# Patient Record
Sex: FEMALE | Race: WHITE | NOT HISPANIC OR LATINO | Employment: OTHER | URBAN - METROPOLITAN AREA
[De-identification: names, ages, dates, MRNs, and addresses within clinical notes are randomized per-mention and may not be internally consistent; named-entity substitution may affect disease eponyms.]

---

## 2023-03-06 LAB
ALANINE AMINOTRANSFERASE (SGPT) (U/L) IN SER/PLAS: 16 U/L (ref 7–45)
ALBUMIN (G/DL) IN SER/PLAS: 4.8 G/DL (ref 3.4–5)
ALKALINE PHOSPHATASE (U/L) IN SER/PLAS: 53 U/L (ref 33–136)
ANION GAP IN SER/PLAS: 13 MMOL/L (ref 10–20)
ASPARTATE AMINOTRANSFERASE (SGOT) (U/L) IN SER/PLAS: 23 U/L (ref 9–39)
BILIRUBIN TOTAL (MG/DL) IN SER/PLAS: 0.5 MG/DL (ref 0–1.2)
CALCIDIOL (25 OH VITAMIN D3) (NG/ML) IN SER/PLAS: 90 NG/ML
CALCIUM (MG/DL) IN SER/PLAS: 10.2 MG/DL (ref 8.6–10.6)
CARBON DIOXIDE, TOTAL (MMOL/L) IN SER/PLAS: 30 MMOL/L (ref 21–32)
CHLORIDE (MMOL/L) IN SER/PLAS: 104 MMOL/L (ref 98–107)
CREATININE (MG/DL) IN SER/PLAS: 0.73 MG/DL (ref 0.5–1.05)
ERYTHROCYTE DISTRIBUTION WIDTH (RATIO) BY AUTOMATED COUNT: 13.6 % (ref 11.5–14.5)
ERYTHROCYTE MEAN CORPUSCULAR HEMOGLOBIN CONCENTRATION (G/DL) BY AUTOMATED: 30.6 G/DL (ref 32–36)
ERYTHROCYTE MEAN CORPUSCULAR VOLUME (FL) BY AUTOMATED COUNT: 95 FL (ref 80–100)
ERYTHROCYTES (10*6/UL) IN BLOOD BY AUTOMATED COUNT: 4.05 X10E12/L (ref 4–5.2)
GFR FEMALE: 78 ML/MIN/1.73M2
GLUCOSE (MG/DL) IN SER/PLAS: 95 MG/DL (ref 74–99)
HEMATOCRIT (%) IN BLOOD BY AUTOMATED COUNT: 38.5 % (ref 36–46)
HEMOGLOBIN (G/DL) IN BLOOD: 11.8 G/DL (ref 12–16)
LEUKOCYTES (10*3/UL) IN BLOOD BY AUTOMATED COUNT: 6.7 X10E9/L (ref 4.4–11.3)
MAGNESIUM (MG/DL) IN SER/PLAS: 2.22 MG/DL (ref 1.6–2.4)
NRBC (PER 100 WBCS) BY AUTOMATED COUNT: 0 /100 WBC (ref 0–0)
PLATELETS (10*3/UL) IN BLOOD AUTOMATED COUNT: 298 X10E9/L (ref 150–450)
POTASSIUM (MMOL/L) IN SER/PLAS: 5 MMOL/L (ref 3.5–5.3)
PROTEIN TOTAL: 7 G/DL (ref 6.4–8.2)
SODIUM (MMOL/L) IN SER/PLAS: 142 MMOL/L (ref 136–145)
THYROTROPIN (MIU/L) IN SER/PLAS BY DETECTION LIMIT <= 0.05 MIU/L: 1.78 MIU/L (ref 0.44–3.98)
UREA NITROGEN (MG/DL) IN SER/PLAS: 23 MG/DL (ref 6–23)

## 2023-09-05 ENCOUNTER — OFFICE VISIT (OUTPATIENT)
Dept: PRIMARY CARE | Facility: CLINIC | Age: 88
End: 2023-09-05
Payer: MEDICARE

## 2023-09-05 VITALS
SYSTOLIC BLOOD PRESSURE: 182 MMHG | HEART RATE: 77 BPM | DIASTOLIC BLOOD PRESSURE: 81 MMHG | OXYGEN SATURATION: 97 % | WEIGHT: 94 LBS | HEIGHT: 59 IN | BODY MASS INDEX: 18.95 KG/M2

## 2023-09-05 DIAGNOSIS — R60.1 GENERALIZED EDEMA: ICD-10-CM

## 2023-09-05 DIAGNOSIS — R26.81 UNSTEADY GAIT: ICD-10-CM
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
## 2023-09-05 DIAGNOSIS — M79.89 LEG SWELLING: ICD-10-CM

## 2023-09-05 DIAGNOSIS — L97.529 ULCER OF BOTH FEET, UNSPECIFIED ULCER STAGE (MULTI): ICD-10-CM

## 2023-09-05 DIAGNOSIS — L97.519 ULCER OF BOTH FEET, UNSPECIFIED ULCER STAGE (MULTI): ICD-10-CM

## 2023-09-05 PROBLEM — M81.8 ADULT IDIOPATHIC GENERALIZED OSTEOPOROSIS: Status: ACTIVE | Noted: 2023-09-05

## 2023-09-05 PROBLEM — K86.89 SECONDARY PANCREATIC INSUFFICIENCY (HHS-HCC): Status: ACTIVE | Noted: 2023-09-05

## 2023-09-05 PROBLEM — I25.10 CAD (CORONARY ARTERY DISEASE): Status: ACTIVE | Noted: 2023-09-05

## 2023-09-05 PROBLEM — J44.9 COPD (CHRONIC OBSTRUCTIVE PULMONARY DISEASE) (MULTI): Status: ACTIVE | Noted: 2023-09-05

## 2023-09-05 PROBLEM — J45.991 COUGH VARIANT ASTHMA (HHS-HCC): Status: ACTIVE | Noted: 2023-09-05

## 2023-09-05 PROBLEM — E78.2 MIXED HYPERLIPIDEMIA: Status: ACTIVE | Noted: 2023-09-05

## 2023-09-05 PROBLEM — I48.91 ATRIAL FIBRILLATION (MULTI): Status: ACTIVE | Noted: 2023-09-05

## 2023-09-05 PROCEDURE — 99214 OFFICE O/P EST MOD 30 MIN: CPT | Performed by: INTERNAL MEDICINE

## 2023-09-05 RX ORDER — PANTOPRAZOLE SODIUM 40 MG/1
40 TABLET, DELAYED RELEASE ORAL 2 TIMES DAILY
COMMUNITY
End: 2024-02-07 | Stop reason: SDUPTHER

## 2023-09-05 RX ORDER — ALENDRONATE SODIUM 70 MG/1
70 TABLET ORAL
COMMUNITY
Start: 2023-03-29

## 2023-09-05 RX ORDER — ACETAMINOPHEN 500 MG
1 TABLET ORAL DAILY
COMMUNITY
Start: 2015-10-15 | End: 2024-02-07 | Stop reason: SDUPTHER

## 2023-09-05 RX ORDER — APIXABAN 2.5 MG/1
2.5 TABLET, FILM COATED ORAL 2 TIMES DAILY
COMMUNITY

## 2023-09-05 RX ORDER — MECLIZINE HCL 12.5 MG 12.5 MG/1
12.5 TABLET ORAL EVERY 8 HOURS PRN
COMMUNITY

## 2023-09-05 RX ORDER — METOPROLOL TARTRATE 25 MG/1
25 TABLET, FILM COATED ORAL 2 TIMES DAILY
COMMUNITY

## 2023-09-05 RX ORDER — SIMVASTATIN 40 MG/1
40 TABLET, FILM COATED ORAL NIGHTLY
COMMUNITY
Start: 2010-07-23 | End: 2024-02-07 | Stop reason: SDUPTHER

## 2023-09-05 NOTE — PROGRESS NOTES
"Subjective   Patient ID: Suly Bradley is a 89 y.o. female who presents for Hospital Follow-up.    HPI     Had a fall two weeks ago in beauty salon.  Tripped, fell forward, landed on knees, didn't hit head. Went to ED, records reviewed, imaging unremarkable. Then was back in the ED on 8/30 as she noticed her legs swelling.  Had neg DVT scan.  Feeling more unsteady on the feet.    Has had sores on both heels for 2-3 months.  Saw Dr Santizo, Podiatrist two weeks ago and nothing was done for her.  No fevers    Review of Systems    Objective   BP (!) 182/81   Pulse 77   Ht 1.499 m (4' 11\")   Wt (!) 42.6 kg (94 lb)   SpO2 97%   BMI 18.99 kg/m²     Physical Exam  Musculoskeletal:      Right lower leg: Edema present.      Left lower leg: Edema present.   Skin:     Comments: Bilateral heels with small 1cm sores without drainage, surrounding erythema or edema.         Assessment/Plan   Problem List Items Addressed This Visit          Musculoskeletal and Injuries    Fall - Primary    Relevant Orders    Referral to Home Care       Symptoms and Signs    Unsteady gait     Other Visit Diagnoses       Ulcer of both feet, unspecified ulcer stage (CMS/HCC)        Relevant Orders    Referral to Wound Clinic    Leg swelling        Relevant Orders    Transthoracic Echo (TTE) Complete    Generalized edema        Relevant Orders    Transthoracic Echo (TTE) Complete               "

## 2023-09-18 ENCOUNTER — TELEPHONE (OUTPATIENT)
Dept: PRIMARY CARE | Facility: CLINIC | Age: 88
End: 2023-09-18
Payer: COMMERCIAL

## 2023-09-18 DIAGNOSIS — R26.81 UNSTEADY GAIT: Primary | ICD-10-CM

## 2023-09-18 NOTE — TELEPHONE ENCOUNTER
Patient's son called. He said Suly has refused the home health order, because she still wants to try and go out to see the doctor.     He was hoping for a referral for Physical Therapy instead

## 2024-02-07 DIAGNOSIS — K21.9 GASTROESOPHAGEAL REFLUX DISEASE WITHOUT ESOPHAGITIS: ICD-10-CM

## 2024-02-07 DIAGNOSIS — E78.2 MIXED HYPERLIPIDEMIA: Primary | ICD-10-CM

## 2024-02-07 DIAGNOSIS — E55.9 VITAMIN D DEFICIENCY: ICD-10-CM

## 2024-02-07 RX ORDER — SIMVASTATIN 40 MG/1
40 TABLET, FILM COATED ORAL NIGHTLY
Qty: 90 TABLET | Refills: 1 | Status: SHIPPED | OUTPATIENT
Start: 2024-02-07

## 2024-02-07 RX ORDER — ACETAMINOPHEN 500 MG
5000 TABLET ORAL DAILY
Qty: 90 TABLET | Refills: 0 | Status: SHIPPED | OUTPATIENT
Start: 2024-02-07

## 2024-02-07 RX ORDER — PANTOPRAZOLE SODIUM 40 MG/1
40 TABLET, DELAYED RELEASE ORAL 2 TIMES DAILY
Qty: 90 TABLET | Refills: 1 | Status: SHIPPED | OUTPATIENT
Start: 2024-02-07

## 2024-02-07 NOTE — TELEPHONE ENCOUNTER
Sabas from [facility] called to have (3) medications refilled and to let physician know he plans on sending out nurse weekly.  He will have Plan of Care faxed to office.

## 2024-04-15 ENCOUNTER — LAB (OUTPATIENT)
Dept: LAB | Facility: LAB | Age: 89
End: 2024-04-15
Payer: MEDICARE

## 2024-04-15 ENCOUNTER — OFFICE VISIT (OUTPATIENT)
Dept: PRIMARY CARE | Facility: CLINIC | Age: 89
End: 2024-04-15
Payer: MEDICARE

## 2024-04-15 ENCOUNTER — APPOINTMENT (OUTPATIENT)
Dept: PRIMARY CARE | Facility: CLINIC | Age: 89
End: 2024-04-15
Payer: MEDICARE

## 2024-04-15 VITALS
WEIGHT: 84 LBS | BODY MASS INDEX: 16.49 KG/M2 | OXYGEN SATURATION: 97 % | HEIGHT: 60 IN | HEART RATE: 75 BPM | DIASTOLIC BLOOD PRESSURE: 69 MMHG | SYSTOLIC BLOOD PRESSURE: 137 MMHG

## 2024-04-15 DIAGNOSIS — I48.91 ATRIAL FIBRILLATION, UNSPECIFIED TYPE (MULTI): ICD-10-CM

## 2024-04-15 DIAGNOSIS — E03.9 HYPOTHYROIDISM, UNSPECIFIED TYPE: ICD-10-CM

## 2024-04-15 DIAGNOSIS — N39.0 URINARY TRACT INFECTION WITHOUT HEMATURIA, SITE UNSPECIFIED: ICD-10-CM

## 2024-04-15 DIAGNOSIS — D64.9 ANEMIA, UNSPECIFIED TYPE: ICD-10-CM

## 2024-04-15 DIAGNOSIS — Z00.00 ROUTINE GENERAL MEDICAL EXAMINATION AT A HEALTH CARE FACILITY: ICD-10-CM

## 2024-04-15 DIAGNOSIS — R44.3 HALLUCINATIONS: Primary | ICD-10-CM

## 2024-04-15 DIAGNOSIS — R73.02 IGT (IMPAIRED GLUCOSE TOLERANCE): ICD-10-CM

## 2024-04-15 LAB
BASOPHILS # BLD AUTO: 0.07 X10*3/UL (ref 0–0.1)
BASOPHILS NFR BLD AUTO: 1.4 %
EOSINOPHIL # BLD AUTO: 0.06 X10*3/UL (ref 0–0.4)
EOSINOPHIL NFR BLD AUTO: 1.2 %
ERYTHROCYTE [DISTWIDTH] IN BLOOD BY AUTOMATED COUNT: 13.8 % (ref 11.5–14.5)
EST. AVERAGE GLUCOSE BLD GHB EST-MCNC: 117 MG/DL
HBA1C MFR BLD: 5.7 %
HCT VFR BLD AUTO: 40.9 % (ref 36–46)
HGB BLD-MCNC: 12.2 G/DL (ref 12–16)
IMM GRANULOCYTES # BLD AUTO: 0.01 X10*3/UL (ref 0–0.5)
IMM GRANULOCYTES NFR BLD AUTO: 0.2 % (ref 0–0.9)
LYMPHOCYTES # BLD AUTO: 1.39 X10*3/UL (ref 0.8–3)
LYMPHOCYTES NFR BLD AUTO: 26.9 %
MCH RBC QN AUTO: 28.6 PG (ref 26–34)
MCHC RBC AUTO-ENTMCNC: 29.8 G/DL (ref 32–36)
MCV RBC AUTO: 96 FL (ref 80–100)
MONOCYTES # BLD AUTO: 0.56 X10*3/UL (ref 0.05–0.8)
MONOCYTES NFR BLD AUTO: 10.9 %
NEUTROPHILS # BLD AUTO: 3.07 X10*3/UL (ref 1.6–5.5)
NEUTROPHILS NFR BLD AUTO: 59.4 %
NRBC BLD-RTO: 0 /100 WBCS (ref 0–0)
PLATELET # BLD AUTO: 301 X10*3/UL (ref 150–450)
RBC # BLD AUTO: 4.26 X10*6/UL (ref 4–5.2)
WBC # BLD AUTO: 5.2 X10*3/UL (ref 4.4–11.3)

## 2024-04-15 PROCEDURE — 1159F MED LIST DOCD IN RCRD: CPT | Performed by: EMERGENCY MEDICINE

## 2024-04-15 PROCEDURE — 80053 COMPREHEN METABOLIC PANEL: CPT

## 2024-04-15 PROCEDURE — 83036 HEMOGLOBIN GLYCOSYLATED A1C: CPT

## 2024-04-15 PROCEDURE — 36415 COLL VENOUS BLD VENIPUNCTURE: CPT

## 2024-04-15 PROCEDURE — 85025 COMPLETE CBC W/AUTO DIFF WBC: CPT

## 2024-04-15 PROCEDURE — 81001 URINALYSIS AUTO W/SCOPE: CPT

## 2024-04-15 PROCEDURE — 99214 OFFICE O/P EST MOD 30 MIN: CPT | Performed by: EMERGENCY MEDICINE

## 2024-04-15 PROCEDURE — 1036F TOBACCO NON-USER: CPT | Performed by: EMERGENCY MEDICINE

## 2024-04-15 PROCEDURE — 84443 ASSAY THYROID STIM HORMONE: CPT

## 2024-04-15 RX ORDER — DICYCLOMINE HYDROCHLORIDE 20 MG/1
20 TABLET ORAL 3 TIMES DAILY PRN
Qty: 30 TABLET | Refills: 0 | Status: SHIPPED | OUTPATIENT
Start: 2024-04-15 | End: 2024-06-14

## 2024-04-15 RX ORDER — LISINOPRIL 2.5 MG/1
TABLET ORAL
COMMUNITY
Start: 2023-12-13

## 2024-04-15 NOTE — PROGRESS NOTES
Subjective   Patient ID: Suly Bradley is a 89 y.o. female who presents for Establish Care.    Assessment/Plan   Problem List Items Addressed This Visit       Atrial fibrillation (Multi)     Other Visit Diagnoses       Hallucinations    -  Primary    Anemia, unspecified type        Relevant Orders    CBC and Auto Differential    Routine general medical examination at a health care facility        Relevant Orders    Comprehensive Metabolic Panel    IGT (impaired glucose tolerance)        Relevant Orders    Hemoglobin A1C    Hypothyroidism, unspecified type        Relevant Orders    TSH with reflex to Free T4 if abnormal    Urinary tract infection without hematuria, site unspecified        Relevant Orders    Urinalysis with Reflex Microscopic          Hallucinations- Check CBC, CMP, TSH and UA/culture   CT brain ordered   Referred to [facility]     Atrial fibrillation- on Metoprolol and eliquis, follows regularly with Dr. Cohen.     Hypertension- stable, continue Lisinopril     GERD- Pantoprazole prn     Follow up as needed     Source of history: Nurse, Medical personnel, Medical record, Patient.  History limitation: None.    HPI  89 y.o. female here to establish care     Patient is very hard of hearing.     Presents with son.   Multiple incidents of auditory and visual hallucinations over the past month, police were called twice    First incident patient heard a man trying to break into her home. Didn't see anyone at first but could hear voices.     Next episode saw a girl walk into the home. States that she asked her to leave but she would not. She saw the girl walk away and into a mirror.     She has been very afraid to fall asleep since and continues to have difficulty sleeping.     Lives in home independently, son lives 5 minutes away. Patient does not want to go to care facility.       Allergies   Allergen Reactions    Sulfa (Sulfonamide Antibiotics) Shortness of breath    Codeine Other    Meperidine Other     Morphine Other    Penicillins Rash       Current Outpatient Medications   Medication Sig Dispense Refill    cholecalciferol (Vitamin D-3) 5,000 Units tablet Take 1 tablet (5,000 Units) by mouth once daily. 90 tablet 0    Eliquis 2.5 mg tablet Take 1 tablet (2.5 mg) by mouth 2 times a day.      lisinopril 2.5 mg tablet Take by mouth.      metoprolol tartrate (Lopressor) 25 mg tablet Take 1 tablet (25 mg) by mouth 2 times a day.      pantoprazole (ProtoNix) 40 mg EC tablet Take 1 tablet (40 mg) by mouth 2 times a day. 90 tablet 1    simvastatin (Zocor) 40 mg tablet Take 1 tablet (40 mg) by mouth once daily at bedtime. 90 tablet 1    alendronate (Fosamax) 70 mg tablet Take 1 tablet (70 mg) by mouth 1 (one) time per week.      meclizine (Antivert) 12.5 mg tablet Take 1 tablet (12.5 mg) by mouth every 8 hours if needed.       No current facility-administered medications for this visit.       Objective   Visit Vitals  /69   Pulse 75   Ht 1.524 m (5')   Wt (!) 38.1 kg (84 lb)   SpO2 97%   BMI 16.41 kg/m²   Smoking Status Never   BSA 1.27 m²     Physical Exam  Vital signs as per nursing/MA documentation   General appearance: Alert and in no acute distress  HEENT: Normal Inspection   Neck: Normal Inspection   Respiratory: No respiratory distress Lungs are clear   Cardiovascular: Heart rate normal. No gallop  Back: Normal Inspection   Skin inspection: Warm   Musculoskeletal: No deformities   Neuro: Limited exam. Baseline    Review of Systems   Comprehensive review of systems as allowed by patient condition and nursing input is negative    No visits with results within 4 Month(s) from this visit.   Latest known visit with results is:   Orders Only on 03/06/2023   Component Date Value Ref Range Status    Glucose 03/06/2023 95  74 - 99 mg/dL Final    Sodium 03/06/2023 142  136 - 145 mmol/L Final    Potassium 03/06/2023 5.0  3.5 - 5.3 mmol/L Final    Chloride 03/06/2023 104  98 - 107 mmol/L Final    Bicarbonate 03/06/2023  30  21 - 32 mmol/L Final    Anion Gap 03/06/2023 13  10 - 20 mmol/L Final    Urea Nitrogen 03/06/2023 23  6 - 23 mg/dL Final    Creatinine 03/06/2023 0.73  0.50 - 1.05 mg/dL Final    GFR Female 03/06/2023 78  >90 mL/min/1.73m2 Final    Calcium 03/06/2023 10.2  8.6 - 10.6 mg/dL Final    Albumin 03/06/2023 4.8  3.4 - 5.0 g/dL Final    Alkaline Phosphatase 03/06/2023 53  33 - 136 U/L Final    Total Protein 03/06/2023 7.0  6.4 - 8.2 g/dL Final    AST 03/06/2023 23  9 - 39 U/L Final    Total Bilirubin 03/06/2023 0.5  0.0 - 1.2 mg/dL Final    ALT (SGPT) 03/06/2023 16  7 - 45 U/L Final       Radiology: Reviewed imaging in powerchart.  No results found.    No family history on file.  Social History     Socioeconomic History    Marital status: Single     Spouse name: None    Number of children: None    Years of education: None    Highest education level: None   Occupational History    None   Tobacco Use    Smoking status: Never    Smokeless tobacco: Never   Substance and Sexual Activity    Alcohol use: Not Currently    Drug use: None    Sexual activity: None   Other Topics Concern    None   Social History Narrative    None     Social Determinants of Health     Financial Resource Strain: Not on file   Food Insecurity: Not on file   Transportation Needs: Not on file   Physical Activity: Not on file   Stress: Not on file   Social Connections: Not on file   Intimate Partner Violence: Not on file   Housing Stability: Not on file     Past Medical History:   Diagnosis Date    Abdominal distension (gaseous) 07/26/2016    Abdominal bloating    Allergy status to unspecified drugs, medicaments and biological substances 12/14/2017    History of allergic reaction    Cellulitis of left toe 12/11/2017    Cellulitis of toe of left foot    Corns and callosities 05/17/2016    Callus    Dry mouth, unspecified     Dry mouth    Dysphagia, unspecified     Difficulty in swallowing    Dysphonia     Hoarseness    Encounter for screening mammogram  for malignant neoplasm of breast 10/15/2015    Visit for screening mammogram    Muscle weakness (generalized)     Muscle weakness    Other acquired deformities of unspecified foot 2016    Hindfoot pronation    Other allergy status, other than to drugs and biological substances     Sensitivity to sunlight    Other conditions influencing health status     History of pregnancy    Pain in unspecified ankle and joints of unspecified foot 2016    Ankle pain    Pain in unspecified foot 2016    Foot pain    Pain in unspecified joint     Joint pain    Personal history of other diseases of the musculoskeletal system and connective tissue     History of bone disorder    Personal history of other diseases of the nervous system and sense organs     History of tinnitus    Personal history of other medical treatment 2017    History of screening mammography    Personal history of other medical treatment     H/O mammogram    Personal history of other specified conditions 2017    History of dizziness    Personal history of other specified conditions 2013    History of abnormal weight loss    Personal history of other specified conditions     History of wheezing    Personal history of pneumonia (recurrent)     History of pneumonia    Spontaneous ecchymoses     Easy bruising    Sprain of joints and ligaments of unspecified parts of neck, initial encounter 2018    Acute cervical sprain    Strain of muscle, fascia and tendon of the posterior muscle group at thigh level, unspecified thigh, initial encounter     Hamstring tear    Tinea unguium 2016    Onychomycosis of toenail    Unspecified asthma, uncomplicated ([facility]) 2013    Asthma    Unspecified injury of unspecified wrist, hand and finger(s), initial encounter 2016    Finger injury     Past Surgical History:   Procedure Laterality Date     SECTION, CLASSIC  2014     Section    GALLBLADDER SURGERY   01/24/2014    Gallbladder Surgery    OTHER SURGICAL HISTORY  01/24/2014    Cath Placement Of Stent 2    TOTAL ABDOMINAL HYSTERECTOMY  01/24/2014    Total Abdominal Hysterectomy       Scribe Attestation  By signing my name below, I, Misbah Phelps   attest that this documentation has been prepared under the direction and in the presence of Aleksandar Vieyra MD.

## 2024-04-16 LAB
ALBUMIN SERPL BCP-MCNC: 4.3 G/DL (ref 3.4–5)
ALP SERPL-CCNC: 60 U/L (ref 33–136)
ALT SERPL W P-5'-P-CCNC: 17 U/L (ref 7–45)
ANION GAP SERPL CALC-SCNC: 12 MMOL/L (ref 10–20)
APPEARANCE UR: CLEAR
AST SERPL W P-5'-P-CCNC: 22 U/L (ref 9–39)
BACTERIA #/AREA URNS AUTO: ABNORMAL /HPF
BILIRUB SERPL-MCNC: 0.2 MG/DL (ref 0–1.2)
BILIRUB UR STRIP.AUTO-MCNC: NEGATIVE MG/DL
BUN SERPL-MCNC: 27 MG/DL (ref 6–23)
CALCIUM SERPL-MCNC: 9.7 MG/DL (ref 8.6–10.6)
CHLORIDE SERPL-SCNC: 106 MMOL/L (ref 98–107)
CO2 SERPL-SCNC: 29 MMOL/L (ref 21–32)
COLOR UR: ABNORMAL
CREAT SERPL-MCNC: 0.58 MG/DL (ref 0.5–1.05)
EGFRCR SERPLBLD CKD-EPI 2021: 86 ML/MIN/1.73M*2
GLUCOSE SERPL-MCNC: 93 MG/DL (ref 74–99)
GLUCOSE UR STRIP.AUTO-MCNC: NORMAL MG/DL
KETONES UR STRIP.AUTO-MCNC: NEGATIVE MG/DL
LEUKOCYTE ESTERASE UR QL STRIP.AUTO: ABNORMAL
MUCOUS THREADS #/AREA URNS AUTO: ABNORMAL /LPF
NITRITE UR QL STRIP.AUTO: NEGATIVE
PH UR STRIP.AUTO: 5 [PH]
POTASSIUM SERPL-SCNC: 4.5 MMOL/L (ref 3.5–5.3)
PROT SERPL-MCNC: 6.7 G/DL (ref 6.4–8.2)
PROT UR STRIP.AUTO-MCNC: ABNORMAL MG/DL
RBC # UR STRIP.AUTO: NEGATIVE /UL
RBC #/AREA URNS AUTO: ABNORMAL /HPF
SODIUM SERPL-SCNC: 142 MMOL/L (ref 136–145)
SP GR UR STRIP.AUTO: 1.02
TSH SERPL-ACNC: 2.27 MIU/L (ref 0.44–3.98)
URATE CRY #/AREA UR COMP ASSIST: ABNORMAL /HPF
UROBILINOGEN UR STRIP.AUTO-MCNC: NORMAL MG/DL
WBC #/AREA URNS AUTO: ABNORMAL /HPF

## 2024-04-18 DIAGNOSIS — N39.0 URINARY TRACT INFECTION WITHOUT HEMATURIA, SITE UNSPECIFIED: ICD-10-CM

## 2024-04-18 RX ORDER — CIPROFLOXACIN 500 MG/1
500 TABLET ORAL 2 TIMES DAILY
Qty: 14 TABLET | Refills: 0 | Status: SHIPPED | OUTPATIENT
Start: 2024-04-18 | End: 2024-04-25

## 2024-04-18 NOTE — TELEPHONE ENCOUNTER
----- Message from Aleksandar Vieyra MD sent at 4/17/2024  9:25 AM EDT -----  Patient has UTI.  If she was not given before, we should call in Cipro 500 mg twice a day for 1 week

## 2024-04-30 ENCOUNTER — TELEPHONE (OUTPATIENT)
Dept: PRIMARY CARE | Facility: CLINIC | Age: 89
End: 2024-04-30
Payer: COMMERCIAL

## 2024-05-25 PROCEDURE — 99231 SBSQ HOSP IP/OBS SF/LOW 25: CPT | Performed by: EMERGENCY MEDICINE

## 2024-06-14 ENCOUNTER — TELEMEDICINE (OUTPATIENT)
Dept: PRIMARY CARE | Facility: CLINIC | Age: 89
End: 2024-06-14
Payer: COMMERCIAL

## 2024-06-14 ENCOUNTER — TELEPHONE (OUTPATIENT)
Dept: PRIMARY CARE | Facility: CLINIC | Age: 89
End: 2024-06-14

## 2024-06-14 VITALS — BODY MASS INDEX: 17.67 KG/M2 | WEIGHT: 90 LBS | HEIGHT: 60 IN

## 2024-06-14 DIAGNOSIS — R60.9 EDEMA, UNSPECIFIED TYPE: Primary | ICD-10-CM

## 2024-06-14 DIAGNOSIS — I48.91 ATRIAL FIBRILLATION, UNSPECIFIED TYPE (MULTI): ICD-10-CM

## 2024-06-14 DIAGNOSIS — E78.2 MIXED HYPERLIPIDEMIA: ICD-10-CM

## 2024-06-14 DIAGNOSIS — J44.9 CHRONIC OBSTRUCTIVE PULMONARY DISEASE, UNSPECIFIED COPD TYPE (MULTI): ICD-10-CM

## 2024-06-14 RX ORDER — RISPERIDONE 1 MG/1
1 TABLET ORAL 2 TIMES DAILY
COMMUNITY
Start: 2024-06-04

## 2024-06-14 RX ORDER — SPIRONOLACTONE 50 MG/1
50 TABLET, FILM COATED ORAL DAILY
Qty: 10 TABLET | Refills: 0 | Status: SHIPPED | OUTPATIENT
Start: 2024-06-14 | End: 2024-06-24

## 2024-06-14 RX ORDER — MIRTAZAPINE 15 MG/1
15 TABLET, FILM COATED ORAL
COMMUNITY
Start: 2024-06-04

## 2024-06-14 RX ORDER — TORSEMIDE 20 MG/1
20 TABLET ORAL DAILY
Qty: 10 TABLET | Refills: 0 | Status: SHIPPED | OUTPATIENT
Start: 2024-06-14 | End: 2024-06-24

## 2024-06-14 NOTE — PROGRESS NOTES
Subjective   Patient ID: Suly Bradley is a 89 y.o. female who presents for No chief complaint on file..    Assessment/Plan   Problem List Items Addressed This Visit    None  Visit Diagnoses       Edema, unspecified type    -  Primary    Relevant Medications    torsemide (Demadex) 20 mg tablet    spironolactone (Aldactone) 50 mg tablet          Visit is completed by discussion with the caregiver at home    Edema-torsemide and Aldactone    Hallucinations- Check CBC, CMP, TSH and UA/culture   CT brain ordered   Referred to [facility]     Atrial fibrillation- on Metoprolol and eliquis, follows regularly with Dr. Cohen.     Hypertension- stable, continue Lisinopril     GERD- Pantoprazole prn     Follow up as needed     Source of history: Nurse, Medical personnel, Medical record, Patient.  History limitation: None.    HPI  89 y.o. female    This visit was completed virtually due to the restrictions of the COVID-19 pandemic. All issues as below were discussed and addressed but no physical exam was performed. If it was felt that the patient should be evaluated in clinic or ER, then they were directed there. The patient verbally consented to visit      Patient is very hard of hearing.     Presents with son.   Multiple incidents of auditory and visual hallucinations over the past month, police were called twice    First incident patient heard a man trying to break into her home. Didn't see anyone at first but could hear voices.     Next episode saw a girl walk into the home. States that she asked her to leave but she would not. She saw the girl walk away and into a mirror.     She has been very afraid to fall asleep since and continues to have difficulty sleeping.     Lives in home independently, son lives 5 minutes away. Patient does not want to go to care facility.       Allergies   Allergen Reactions    Sulfa (Sulfonamide Antibiotics) Shortness of breath    Codeine Other    Meperidine Other    Morphine Other    Penicillins  Rash       Current Outpatient Medications   Medication Sig Dispense Refill    cholecalciferol (Vitamin D-3) 5,000 Units tablet Take 1 tablet (5,000 Units) by mouth once daily. 90 tablet 0    Eliquis 2.5 mg tablet Take 1 tablet (2.5 mg) by mouth 2 times a day.      lisinopril 2.5 mg tablet Take by mouth once daily.      metoprolol tartrate (Lopressor) 25 mg tablet Take 1 tablet (25 mg) by mouth 2 times a day.      pantoprazole (ProtoNix) 40 mg EC tablet Take 1 tablet (40 mg) by mouth 2 times a day. 90 tablet 1    Remeron 15 mg tablet 1 tablet (15 mg).      RisperDAL 1 mg tablet 1 tablet (1 mg) 2 times a day.      alendronate (Fosamax) 70 mg tablet Take 1 tablet (70 mg) by mouth 1 (one) time per week.      dicyclomine (Bentyl) 20 mg tablet Take 1 tablet (20 mg) by mouth 3 times a day as needed (abdominal pain or cramps). (Patient not taking: Reported on 6/14/2024) 30 tablet 0    meclizine (Antivert) 12.5 mg tablet Take 1 tablet (12.5 mg) by mouth every 8 hours if needed.      simvastatin (Zocor) 40 mg tablet Take 1 tablet (40 mg) by mouth once daily at bedtime. (Patient not taking: Reported on 6/14/2024) 90 tablet 1    spironolactone (Aldactone) 50 mg tablet Take 1 tablet (50 mg) by mouth once daily for 10 days. 10 tablet 0    torsemide (Demadex) 20 mg tablet Take 1 tablet (20 mg) by mouth once daily for 10 days. 10 tablet 0     No current facility-administered medications for this visit.         Physical Exam  Vital signs as per nursing/MA documentation   General appearance: Alert and in no acute distress  HEENT: Normal Inspection   Neck: Normal Inspection   Respiratory: No respiratory distress Lungs are clear   Cardiovascular: Heart rate normal. No gallop  Back: Normal Inspection   Skin inspection: Warm   Musculoskeletal: No deformities   Neuro: Limited exam. Baseline    Review of Systems   Comprehensive review of systems as allowed by patient condition and nursing input is negative    Lab on 04/15/2024   Component  Date Value Ref Range Status    WBC 04/15/2024 5.2  4.4 - 11.3 x10*3/uL Final    nRBC 04/15/2024 0.0  0.0 - 0.0 /100 WBCs Final    RBC 04/15/2024 4.26  4.00 - 5.20 x10*6/uL Final    Hemoglobin 04/15/2024 12.2  12.0 - 16.0 g/dL Final    Hematocrit 04/15/2024 40.9  36.0 - 46.0 % Final    MCV 04/15/2024 96  80 - 100 fL Final    MCH 04/15/2024 28.6  26.0 - 34.0 pg Final    MCHC 04/15/2024 29.8 (L)  32.0 - 36.0 g/dL Final    RDW 04/15/2024 13.8  11.5 - 14.5 % Final    Platelets 04/15/2024 301  150 - 450 x10*3/uL Final    Neutrophils % 04/15/2024 59.4  40.0 - 80.0 % Final    Immature Granulocytes %, Automated 04/15/2024 0.2  0.0 - 0.9 % Final    Lymphocytes % 04/15/2024 26.9  13.0 - 44.0 % Final    Monocytes % 04/15/2024 10.9  2.0 - 10.0 % Final    Eosinophils % 04/15/2024 1.2  0.0 - 6.0 % Final    Basophils % 04/15/2024 1.4  0.0 - 2.0 % Final    Neutrophils Absolute 04/15/2024 3.07  1.60 - 5.50 x10*3/uL Final    Immature Granulocytes Absolute, Au* 04/15/2024 0.01  0.00 - 0.50 x10*3/uL Final    Lymphocytes Absolute 04/15/2024 1.39  0.80 - 3.00 x10*3/uL Final    Monocytes Absolute 04/15/2024 0.56  0.05 - 0.80 x10*3/uL Final    Eosinophils Absolute 04/15/2024 0.06  0.00 - 0.40 x10*3/uL Final    Basophils Absolute 04/15/2024 0.07  0.00 - 0.10 x10*3/uL Final    Glucose 04/15/2024 93  74 - 99 mg/dL Final    Sodium 04/15/2024 142  136 - 145 mmol/L Final    Potassium 04/15/2024 4.5  3.5 - 5.3 mmol/L Final    Chloride 04/15/2024 106  98 - 107 mmol/L Final    Bicarbonate 04/15/2024 29  21 - 32 mmol/L Final    Anion Gap 04/15/2024 12  10 - 20 mmol/L Final    Urea Nitrogen 04/15/2024 27 (H)  6 - 23 mg/dL Final    Creatinine 04/15/2024 0.58  0.50 - 1.05 mg/dL Final    eGFR 04/15/2024 86  >60 mL/min/1.73m*2 Final    Calcium 04/15/2024 9.7  8.6 - 10.6 mg/dL Final    Albumin 04/15/2024 4.3  3.4 - 5.0 g/dL Final    Alkaline Phosphatase 04/15/2024 60  33 - 136 U/L Final    Total Protein 04/15/2024 6.7  6.4 - 8.2 g/dL Final    AST  04/15/2024 22  9 - 39 U/L Final    Bilirubin, Total 04/15/2024 0.2  0.0 - 1.2 mg/dL Final    ALT 04/15/2024 17  7 - 45 U/L Final    Hemoglobin A1C 04/15/2024 5.7 (H)  see below % Final    Estimated Average Glucose 04/15/2024 117  Not Established mg/dL Final    Thyroid Stimulating Hormone 04/15/2024 2.27  0.44 - 3.98 mIU/L Final    Color, Urine 04/15/2024 Light-Yellow  Light-Yellow, Yellow, Dark-Yellow Final    Appearance, Urine 04/15/2024 Clear  Clear Final    Specific Gravity, Urine 04/15/2024 1.024  1.005 - 1.035 Final    pH, Urine 04/15/2024 5.0  5.0, 5.5, 6.0, 6.5, 7.0, 7.5, 8.0 Final    Protein, Urine 04/15/2024 30 (1+) (A)  NEGATIVE, 10 (TRACE), 20 (TRACE) mg/dL Final    Glucose, Urine 04/15/2024 Normal  Normal mg/dL Final    Blood, Urine 04/15/2024 NEGATIVE  NEGATIVE Final    Ketones, Urine 04/15/2024 NEGATIVE  NEGATIVE mg/dL Final    Bilirubin, Urine 04/15/2024 NEGATIVE  NEGATIVE Final    Urobilinogen, Urine 04/15/2024 Normal  Normal mg/dL Final    Nitrite, Urine 04/15/2024 NEGATIVE  NEGATIVE Final    Leukocyte Esterase, Urine 04/15/2024 75 Liana/µL (A)  NEGATIVE Final    WBC, Urine 04/15/2024 6-10 (A)  1-5, NONE /HPF Final    RBC, Urine 04/15/2024 1-2  NONE, 1-2, 3-5 /HPF Final    Bacteria, Urine 04/15/2024 1+ (A)  NONE SEEN /HPF Final    Mucus, Urine 04/15/2024 1+  Reference range not established. /LPF Final    Uric Acid Crystals, Urine 04/15/2024 2+ (A)  NONE, 1+ /HPF Final       Radiology: Reviewed imaging in powerchart.  No results found.    No family history on file.  Social History     Socioeconomic History    Marital status: Single     Spouse name: None    Number of children: None    Years of education: None    Highest education level: None   Occupational History    None   Tobacco Use    Smoking status: Never    Smokeless tobacco: Never   Substance and Sexual Activity    Alcohol use: Not Currently    Drug use: None    Sexual activity: None   Other Topics Concern    None   Social History Narrative     None     Social Determinants of Health     Financial Resource Strain: Not on file   Food Insecurity: Not on file   Transportation Needs: Not on file   Physical Activity: Not on file   Stress: Not on file   Social Connections: Not on file   Intimate Partner Violence: Not on file   Housing Stability: Not on file     Past Medical History:   Diagnosis Date    Abdominal distension (gaseous) 07/26/2016    Abdominal bloating    Allergy status to unspecified drugs, medicaments and biological substances 12/14/2017    History of allergic reaction    Cellulitis of left toe 12/11/2017    Cellulitis of toe of left foot    Corns and callosities 05/17/2016    Callus    Dry mouth, unspecified     Dry mouth    Dysphagia, unspecified     Difficulty in swallowing    Dysphonia     Hoarseness    Encounter for screening mammogram for malignant neoplasm of breast 10/15/2015    Visit for screening mammogram    Muscle weakness (generalized)     Muscle weakness    Other acquired deformities of unspecified foot 05/17/2016    Hindfoot pronation    Other allergy status, other than to drugs and biological substances     Sensitivity to sunlight    Other conditions influencing health status     History of pregnancy    Pain in unspecified ankle and joints of unspecified foot 05/26/2016    Ankle pain    Pain in unspecified foot 05/16/2016    Foot pain    Pain in unspecified joint     Joint pain    Personal history of other diseases of the musculoskeletal system and connective tissue     History of bone disorder    Personal history of other diseases of the nervous system and sense organs     History of tinnitus    Personal history of other medical treatment 01/24/2017    History of screening mammography    Personal history of other medical treatment     H/O mammogram    Personal history of other specified conditions 03/29/2017    History of dizziness    Personal history of other specified conditions 12/06/2013    History of abnormal weight loss     Personal history of other specified conditions     History of wheezing    Personal history of pneumonia (recurrent)     History of pneumonia    Spontaneous ecchymoses     Easy bruising    Sprain of joints and ligaments of unspecified parts of neck, initial encounter 2018    Acute cervical sprain    Strain of muscle, fascia and tendon of the posterior muscle group at thigh level, unspecified thigh, initial encounter     Hamstring tear    Tinea unguium 2016    Onychomycosis of toenail    Unspecified asthma, uncomplicated ([facility]) 2013    Asthma    Unspecified injury of unspecified wrist, hand and finger(s), initial encounter 2016    Finger injury     Past Surgical History:   Procedure Laterality Date     SECTION, CLASSIC  2014     Section    GALLBLADDER SURGERY  2014    Gallbladder Surgery    OTHER SURGICAL HISTORY  2014    Cath Placement Of Stent 2    TOTAL ABDOMINAL HYSTERECTOMY  2014    Total Abdominal Hysterectomy       Scribe Attestation  By signing my name below, I, Aleksandar Vieyra MD , Scribe   attest that this documentation has been prepared under the direction and in the presence of Aleksandar Vieyra MD.

## 2024-06-14 NOTE — TELEPHONE ENCOUNTER
Pt son is worried about his mother's stomach and feet/legs being swollen and possibility of a UTI. Pt son stated her stomach is distended and he is wondering if he were to schedule a virtual visit, since he is POA, if he could give permission for the pt caregiver to be on the call rather than him since he works, thank you

## 2024-06-26 ENCOUNTER — TELEPHONE (OUTPATIENT)
Dept: PRIMARY CARE | Facility: CLINIC | Age: 89
End: 2024-06-26
Payer: COMMERCIAL

## 2024-06-28 DIAGNOSIS — R60.9 EDEMA, UNSPECIFIED TYPE: ICD-10-CM

## 2024-06-28 RX ORDER — TORSEMIDE 20 MG/1
20 TABLET ORAL DAILY
Qty: 10 TABLET | Refills: 0 | Status: SHIPPED | OUTPATIENT
Start: 2024-06-28 | End: 2024-07-08

## 2024-06-28 RX ORDER — SPIRONOLACTONE 50 MG/1
50 TABLET, FILM COATED ORAL DAILY
Qty: 10 TABLET | Refills: 0 | Status: SHIPPED | OUTPATIENT
Start: 2024-06-28 | End: 2024-07-08

## 2024-07-05 DIAGNOSIS — K21.9 GASTROESOPHAGEAL REFLUX DISEASE WITHOUT ESOPHAGITIS: ICD-10-CM

## 2024-07-05 RX ORDER — PANTOPRAZOLE SODIUM 40 MG/1
40 TABLET, DELAYED RELEASE ORAL 2 TIMES DAILY
Qty: 90 TABLET | Refills: 1 | Status: SHIPPED | OUTPATIENT
Start: 2024-07-05

## 2024-07-26 DIAGNOSIS — R60.9 EDEMA, UNSPECIFIED TYPE: ICD-10-CM

## 2024-07-26 RX ORDER — SPIRONOLACTONE 50 MG/1
50 TABLET, FILM COATED ORAL DAILY
Qty: 10 TABLET | Refills: 0 | Status: SHIPPED | OUTPATIENT
Start: 2024-07-26 | End: 2024-08-05

## 2024-07-26 RX ORDER — TORSEMIDE 20 MG/1
20 TABLET ORAL DAILY
Qty: 10 TABLET | Refills: 0 | Status: SHIPPED | OUTPATIENT
Start: 2024-07-26 | End: 2024-08-05

## 2024-07-31 DIAGNOSIS — K21.9 GASTROESOPHAGEAL REFLUX DISEASE WITHOUT ESOPHAGITIS: ICD-10-CM

## 2024-07-31 RX ORDER — PANTOPRAZOLE SODIUM 40 MG/1
40 TABLET, DELAYED RELEASE ORAL 2 TIMES DAILY
Qty: 90 TABLET | Refills: 1 | Status: SHIPPED | OUTPATIENT
Start: 2024-07-31

## 2024-09-04 ENCOUNTER — OFFICE VISIT (OUTPATIENT)
Dept: PRIMARY CARE | Facility: CLINIC | Age: 89
End: 2024-09-04
Payer: MEDICARE

## 2024-09-04 VITALS
HEART RATE: 71 BPM | OXYGEN SATURATION: 97 % | SYSTOLIC BLOOD PRESSURE: 132 MMHG | DIASTOLIC BLOOD PRESSURE: 70 MMHG | BODY MASS INDEX: 17.58 KG/M2 | HEIGHT: 60 IN

## 2024-09-04 DIAGNOSIS — K64.9 HEMORRHOIDS, UNSPECIFIED HEMORRHOID TYPE: ICD-10-CM

## 2024-09-04 DIAGNOSIS — R60.9 EDEMA, UNSPECIFIED TYPE: ICD-10-CM

## 2024-09-04 DIAGNOSIS — I48.91 ATRIAL FIBRILLATION, UNSPECIFIED TYPE (MULTI): Primary | ICD-10-CM

## 2024-09-04 DIAGNOSIS — K59.00 CONSTIPATION, UNSPECIFIED CONSTIPATION TYPE: ICD-10-CM

## 2024-09-04 DIAGNOSIS — I10 HYPERTENSION, UNSPECIFIED TYPE: ICD-10-CM

## 2024-09-04 DIAGNOSIS — R60.0 EDEMA OF BOTH LEGS: ICD-10-CM

## 2024-09-04 PROCEDURE — 3075F SYST BP GE 130 - 139MM HG: CPT | Performed by: STUDENT IN AN ORGANIZED HEALTH CARE EDUCATION/TRAINING PROGRAM

## 2024-09-04 PROCEDURE — 1036F TOBACCO NON-USER: CPT | Performed by: STUDENT IN AN ORGANIZED HEALTH CARE EDUCATION/TRAINING PROGRAM

## 2024-09-04 PROCEDURE — 1160F RVW MEDS BY RX/DR IN RCRD: CPT | Performed by: STUDENT IN AN ORGANIZED HEALTH CARE EDUCATION/TRAINING PROGRAM

## 2024-09-04 PROCEDURE — 99214 OFFICE O/P EST MOD 30 MIN: CPT | Performed by: STUDENT IN AN ORGANIZED HEALTH CARE EDUCATION/TRAINING PROGRAM

## 2024-09-04 PROCEDURE — 1159F MED LIST DOCD IN RCRD: CPT | Performed by: STUDENT IN AN ORGANIZED HEALTH CARE EDUCATION/TRAINING PROGRAM

## 2024-09-04 PROCEDURE — 3078F DIAST BP <80 MM HG: CPT | Performed by: STUDENT IN AN ORGANIZED HEALTH CARE EDUCATION/TRAINING PROGRAM

## 2024-09-04 RX ORDER — TORSEMIDE 20 MG/1
20 TABLET ORAL DAILY
Qty: 10 TABLET | Refills: 0 | Status: SHIPPED | OUTPATIENT
Start: 2024-09-04 | End: 2024-09-14

## 2024-09-04 RX ORDER — GLYCERIN, PETROLATUM, PHENYLEPHRINE HCL, PRAMOXINE HCL 144; 2.5; 10; 15 MG/G; MG/G; MG/G; MG/G
1 CREAM TOPICAL DAILY
Qty: 26 G | Refills: 0 | Status: SHIPPED | OUTPATIENT
Start: 2024-09-04 | End: 2024-09-09

## 2024-09-04 RX ORDER — AMOXICILLIN 250 MG
1 CAPSULE ORAL DAILY
Qty: 30 TABLET | Refills: 0 | Status: SHIPPED | OUTPATIENT
Start: 2024-09-04 | End: 2025-09-04

## 2024-09-04 NOTE — PROGRESS NOTES
Subjective   Patient ID: Suly Bradley is a 89 y.o. female who presents for the following    Assessment/Plan   Constipation  Hemorrhoids    PLAN  -Senakot S BID x 5 days  -Preparation H topical every day x 5 days    BLLE Edema  Hx of A-Fib  Hx of HTN  PLAN  -Refill Torsemide 20mg PO daily x 10 days  -Echocardiogram ordered  -Follow up with PCP and Cardiology   -Continue using compression stockings  -Elevated legs when sitting  -Stay ambulatory as much as possible.     HPI  89F presents for acute sick visit. She has a hx of Afib, HTN, GERD, psychiatric disturbance.     Was previously given torsemide/lasix for edema which helped, but edema returned after stopping loop diuretic. Also complaining of hemorrhoidal pain and constipation. Denies any bloody stools, black stools.     Denies any major SOB/OCONNOR, CP, palpitations.     Denies fevers, chills, weight loss, lightheadedness, dizziness, vision changes, sore throat, runny nose, CP, SOB, cough, palpitations, n/v/d, abd pain, black/bloody stools, arthralgias, mood disturbance, or new numbness/weakness/tingling in arms/legs/face.        Past Medical History:   Diagnosis Date   • Abdominal distension (gaseous) 07/26/2016    Abdominal bloating   • Allergy status to unspecified drugs, medicaments and biological substances 12/14/2017    History of allergic reaction   • Cellulitis of left toe 12/11/2017    Cellulitis of toe of left foot   • Corns and callosities 05/17/2016    Callus   • Dry mouth, unspecified     Dry mouth   • Dysphagia, unspecified     Difficulty in swallowing   • Dysphonia     Hoarseness   • Encounter for screening mammogram for malignant neoplasm of breast 10/15/2015    Visit for screening mammogram   • Muscle weakness (generalized)     Muscle weakness   • Other acquired deformities of unspecified foot 05/17/2016    Hindfoot pronation   • Other allergy status, other than to drugs and biological substances     Sensitivity to sunlight   • Other conditions  influencing health status     History of pregnancy   • Pain in unspecified ankle and joints of unspecified foot 2016    Ankle pain   • Pain in unspecified foot 2016    Foot pain   • Pain in unspecified joint     Joint pain   • Personal history of other diseases of the musculoskeletal system and connective tissue     History of bone disorder   • Personal history of other diseases of the nervous system and sense organs     History of tinnitus   • Personal history of other medical treatment 2017    History of screening mammography   • Personal history of other medical treatment     H/O mammogram   • Personal history of other specified conditions 2017    History of dizziness   • Personal history of other specified conditions 2013    History of abnormal weight loss   • Personal history of other specified conditions     History of wheezing   • Personal history of pneumonia (recurrent)     History of pneumonia   • Spontaneous ecchymoses     Easy bruising   • Sprain of joints and ligaments of unspecified parts of neck, initial encounter 2018    Acute cervical sprain   • Strain of muscle, fascia and tendon of the posterior muscle group at thigh level, unspecified thigh, initial encounter     Hamstring tear   • Tinea unguium 2016    Onychomycosis of toenail   • Unspecified asthma, uncomplicated ([facility]) 2013    Asthma   • Unspecified injury of unspecified wrist, hand and finger(s), initial encounter 2016    Finger injury       Past Surgical History:   Procedure Laterality Date   •  SECTION, CLASSIC  2014     Section   • GALLBLADDER SURGERY  2014    Gallbladder Surgery   • OTHER SURGICAL HISTORY  2014    Cath Placement Of Stent 2   • TOTAL ABDOMINAL HYSTERECTOMY  2014    Total Abdominal Hysterectomy       No family history on file.    Social Determinants of Health     Tobacco Use: Low Risk  (2024)    Patient History    • Smoking  Tobacco Use: Never    • Smokeless Tobacco Use: Never    • Passive Exposure: Not on file   Alcohol Use: Not on file   Financial Resource Strain: Not on file   Food Insecurity: Not on file   Transportation Needs: Not on file   Physical Activity: Not on file   Stress: Not on file   Social Connections: Not on file   Intimate Partner Violence: Not on file   Depression: Not on file   Housing Stability: Not on file   Utilities: Not on file   Digital Equity: Not on file   Health Literacy: Not on file         Visit Vitals  /70   Pulse 71   Ht 1.524 m (5')   SpO2 97%   BMI 17.58 kg/m²   Smoking Status Never   BSA 1.31 m²     PHYSICAL EXAM   Physical Exam       General: NAD. NCAT. Aox3   HEENT: PERRLA. EOMI. MMM. Nares patent bl.  Cardiovascular: RRR. No MRG. S1/S2 wnl.   Respiratory: CTABL. No acute respiratory distress.   GI: Soft, NT abdomen. Minimally distended. BS present x 4.   MSK: Generalized weakness, in a wheelchair, but can ambulate with a walker.   Extremities: BLLE 2+ pitting edema to above the knees.   Skin: No rashes or bruises.   Neuro: Aox3. Cranial Nerves grossly intact. Motor/sensory wnl. Underlying dementia   Psych: Mood wnl.      REVIEW OF SYSTEMS   ROS in HPI     Allergies   Allergen Reactions   • Sulfa (Sulfonamide Antibiotics) Shortness of breath   • Codeine Other   • Meperidine Other   • Morphine Other   • Penicillins Rash       Current Outpatient Medications   Medication Sig Dispense Refill   • alendronate (Fosamax) 70 mg tablet Take 1 tablet (70 mg) by mouth 1 (one) time per week.     • cholecalciferol (Vitamin D-3) 5,000 Units tablet Take 1 tablet (5,000 Units) by mouth once daily. 90 tablet 0   • Eliquis 2.5 mg tablet Take 1 tablet (2.5 mg) by mouth 2 times a day.     • lisinopril 2.5 mg tablet Take by mouth once daily.     • meclizine (Antivert) 12.5 mg tablet Take 1 tablet (12.5 mg) by mouth every 8 hours if needed.     • metoprolol tartrate (Lopressor) 25 mg tablet Take 1 tablet (25 mg) by  mouth 2 times a day.     • pantoprazole (ProtoNix) 40 mg EC tablet Take 1 tablet (40 mg) by mouth 2 times a day. 90 tablet 1   • Remeron 15 mg tablet 1 tablet (15 mg).     • RisperDAL 1 mg tablet 1 tablet (1 mg) 2 times a day.     • simvastatin (Zocor) 40 mg tablet Take 1 tablet (40 mg) by mouth once daily at bedtime. (Patient not taking: Reported on 6/14/2024) 90 tablet 1   • spironolactone (Aldactone) 50 mg tablet Take 1 tablet (50 mg) by mouth once daily for 10 days. 10 tablet 0   • torsemide (Demadex) 20 mg tablet Take 1 tablet (20 mg) by mouth once daily for 10 days. 10 tablet 0     No current facility-administered medications for this visit.       Objective     No visits with results within 4 Month(s) from this visit.   Latest known visit with results is:   Lab on 04/15/2024   Component Date Value Ref Range Status   • WBC 04/15/2024 5.2  4.4 - 11.3 x10*3/uL Final   • nRBC 04/15/2024 0.0  0.0 - 0.0 /100 WBCs Final   • RBC 04/15/2024 4.26  4.00 - 5.20 x10*6/uL Final   • Hemoglobin 04/15/2024 12.2  12.0 - 16.0 g/dL Final   • Hematocrit 04/15/2024 40.9  36.0 - 46.0 % Final   • MCV 04/15/2024 96  80 - 100 fL Final   • MCH 04/15/2024 28.6  26.0 - 34.0 pg Final   • MCHC 04/15/2024 29.8 (L)  32.0 - 36.0 g/dL Final   • RDW 04/15/2024 13.8  11.5 - 14.5 % Final   • Platelets 04/15/2024 301  150 - 450 x10*3/uL Final   • Neutrophils % 04/15/2024 59.4  40.0 - 80.0 % Final   • Immature Granulocytes %, Automated 04/15/2024 0.2  0.0 - 0.9 % Final   • Lymphocytes % 04/15/2024 26.9  13.0 - 44.0 % Final   • Monocytes % 04/15/2024 10.9  2.0 - 10.0 % Final   • Eosinophils % 04/15/2024 1.2  0.0 - 6.0 % Final   • Basophils % 04/15/2024 1.4  0.0 - 2.0 % Final   • Neutrophils Absolute 04/15/2024 3.07  1.60 - 5.50 x10*3/uL Final   • Immature Granulocytes Absolute, Au* 04/15/2024 0.01  0.00 - 0.50 x10*3/uL Final   • Lymphocytes Absolute 04/15/2024 1.39  0.80 - 3.00 x10*3/uL Final   • Monocytes Absolute 04/15/2024 0.56  0.05 - 0.80  x10*3/uL Final   • Eosinophils Absolute 04/15/2024 0.06  0.00 - 0.40 x10*3/uL Final   • Basophils Absolute 04/15/2024 0.07  0.00 - 0.10 x10*3/uL Final   • Glucose 04/15/2024 93  74 - 99 mg/dL Final   • Sodium 04/15/2024 142  136 - 145 mmol/L Final   • Potassium 04/15/2024 4.5  3.5 - 5.3 mmol/L Final   • Chloride 04/15/2024 106  98 - 107 mmol/L Final   • Bicarbonate 04/15/2024 29  21 - 32 mmol/L Final   • Anion Gap 04/15/2024 12  10 - 20 mmol/L Final   • Urea Nitrogen 04/15/2024 27 (H)  6 - 23 mg/dL Final   • Creatinine 04/15/2024 0.58  0.50 - 1.05 mg/dL Final   • eGFR 04/15/2024 86  >60 mL/min/1.73m*2 Final   • Calcium 04/15/2024 9.7  8.6 - 10.6 mg/dL Final   • Albumin 04/15/2024 4.3  3.4 - 5.0 g/dL Final   • Alkaline Phosphatase 04/15/2024 60  33 - 136 U/L Final   • Total Protein 04/15/2024 6.7  6.4 - 8.2 g/dL Final   • AST 04/15/2024 22  9 - 39 U/L Final   • Bilirubin, Total 04/15/2024 0.2  0.0 - 1.2 mg/dL Final   • ALT 04/15/2024 17  7 - 45 U/L Final   • Hemoglobin A1C 04/15/2024 5.7 (H)  see below % Final   • Estimated Average Glucose 04/15/2024 117  Not Established mg/dL Final   • Thyroid Stimulating Hormone 04/15/2024 2.27  0.44 - 3.98 mIU/L Final   • Color, Urine 04/15/2024 Light-Yellow  Light-Yellow, Yellow, Dark-Yellow Final   • Appearance, Urine 04/15/2024 Clear  Clear Final   • Specific Gravity, Urine 04/15/2024 1.024  1.005 - 1.035 Final   • pH, Urine 04/15/2024 5.0  5.0, 5.5, 6.0, 6.5, 7.0, 7.5, 8.0 Final   • Protein, Urine 04/15/2024 30 (1+) (A)  NEGATIVE, 10 (TRACE), 20 (TRACE) mg/dL Final   • Glucose, Urine 04/15/2024 Normal  Normal mg/dL Final   • Blood, Urine 04/15/2024 NEGATIVE  NEGATIVE Final   • Ketones, Urine 04/15/2024 NEGATIVE  NEGATIVE mg/dL Final   • Bilirubin, Urine 04/15/2024 NEGATIVE  NEGATIVE Final   • Urobilinogen, Urine 04/15/2024 Normal  Normal mg/dL Final   • Nitrite, Urine 04/15/2024 NEGATIVE  NEGATIVE Final   • Leukocyte Esterase, Urine 04/15/2024 75 Liana/µL (A)  NEGATIVE Final   •  WBC, Urine 04/15/2024 6-10 (A)  1-5, NONE /HPF Final   • RBC, Urine 04/15/2024 1-2  NONE, 1-2, 3-5 /HPF Final   • Bacteria, Urine 04/15/2024 1+ (A)  NONE SEEN /HPF Final   • Mucus, Urine 04/15/2024 1+  Reference range not established. /LPF Final   • Uric Acid Crystals, Urine 04/15/2024 2+ (A)  NONE, 1+ /HPF Final       Radiology: Reviewed imaging in powerchart.  No results found.    No family history on file.  Social History     Socioeconomic History   • Marital status: Single   Tobacco Use   • Smoking status: Never   • Smokeless tobacco: Never   Substance and Sexual Activity   • Alcohol use: Not Currently     Past Medical History:   Diagnosis Date   • Abdominal distension (gaseous) 07/26/2016    Abdominal bloating   • Allergy status to unspecified drugs, medicaments and biological substances 12/14/2017    History of allergic reaction   • Cellulitis of left toe 12/11/2017    Cellulitis of toe of left foot   • Corns and callosities 05/17/2016    Callus   • Dry mouth, unspecified     Dry mouth   • Dysphagia, unspecified     Difficulty in swallowing   • Dysphonia     Hoarseness   • Encounter for screening mammogram for malignant neoplasm of breast 10/15/2015    Visit for screening mammogram   • Muscle weakness (generalized)     Muscle weakness   • Other acquired deformities of unspecified foot 05/17/2016    Hindfoot pronation   • Other allergy status, other than to drugs and biological substances     Sensitivity to sunlight   • Other conditions influencing health status     History of pregnancy   • Pain in unspecified ankle and joints of unspecified foot 05/26/2016    Ankle pain   • Pain in unspecified foot 05/16/2016    Foot pain   • Pain in unspecified joint     Joint pain   • Personal history of other diseases of the musculoskeletal system and connective tissue     History of bone disorder   • Personal history of other diseases of the nervous system and sense organs     History of tinnitus   • Personal history of  other medical treatment 2017    History of screening mammography   • Personal history of other medical treatment     H/O mammogram   • Personal history of other specified conditions 2017    History of dizziness   • Personal history of other specified conditions 2013    History of abnormal weight loss   • Personal history of other specified conditions     History of wheezing   • Personal history of pneumonia (recurrent)     History of pneumonia   • Spontaneous ecchymoses     Easy bruising   • Sprain of joints and ligaments of unspecified parts of neck, initial encounter 2018    Acute cervical sprain   • Strain of muscle, fascia and tendon of the posterior muscle group at thigh level, unspecified thigh, initial encounter     Hamstring tear   • Tinea unguium 2016    Onychomycosis of toenail   • Unspecified asthma, uncomplicated ([facility]) 2013    Asthma   • Unspecified injury of unspecified wrist, hand and finger(s), initial encounter 2016    Finger injury     Past Surgical History:   Procedure Laterality Date   •  SECTION, CLASSIC  2014     Section   • GALLBLADDER SURGERY  2014    Gallbladder Surgery   • OTHER SURGICAL HISTORY  2014    Cath Placement Of Stent 2   • TOTAL ABDOMINAL HYSTERECTOMY  2014    Total Abdominal Hysterectomy       Charting was completed using voice recognition technology and may include unintended errors.

## 2024-10-01 ENCOUNTER — TELEPHONE (OUTPATIENT)
Dept: PRIMARY CARE | Facility: CLINIC | Age: 89
End: 2024-10-01
Payer: COMMERCIAL

## 2024-10-01 NOTE — TELEPHONE ENCOUNTER
Lmtcb regarding echocardiogram result.     Dr. Stanton stated echo shows mild aortic stenosis, no evidence of any acute congestive heart failure.

## 2024-10-03 DIAGNOSIS — K64.9 HEMORRHOIDS, UNSPECIFIED HEMORRHOID TYPE: ICD-10-CM

## 2024-10-03 DIAGNOSIS — K59.00 CONSTIPATION, UNSPECIFIED CONSTIPATION TYPE: ICD-10-CM

## 2024-10-08 RX ORDER — DOCUSATE SODIUM 50 MG AND SENNOSIDES 8.6 MG 8.6; 5 MG/1; MG/1
1 TABLET, FILM COATED ORAL DAILY
Qty: 30 TABLET | Refills: 0 | Status: SHIPPED | OUTPATIENT
Start: 2024-10-08

## 2024-10-28 ENCOUNTER — TELEMEDICINE (OUTPATIENT)
Dept: PRIMARY CARE | Facility: CLINIC | Age: 89
End: 2024-10-28
Payer: MEDICARE

## 2024-10-28 DIAGNOSIS — M05.711 RHEUMATOID ARTHRITIS INVOLVING RIGHT SHOULDER WITH POSITIVE RHEUMATOID FACTOR (MULTI): ICD-10-CM

## 2024-10-28 DIAGNOSIS — N39.0 UTI (URINARY TRACT INFECTION), UNCOMPLICATED: Primary | ICD-10-CM

## 2024-10-28 DIAGNOSIS — K86.1 OTHER CHRONIC PANCREATITIS: ICD-10-CM

## 2024-10-28 DIAGNOSIS — F02.B4 MODERATE DEMENTIA DUE TO PARKINSON'S DISEASE, WITH ANXIETY (MULTI): ICD-10-CM

## 2024-10-28 DIAGNOSIS — L97.519 ULCER OF BOTH FEET, UNSPECIFIED ULCER STAGE (MULTI): ICD-10-CM

## 2024-10-28 DIAGNOSIS — L97.529 ULCER OF BOTH FEET, UNSPECIFIED ULCER STAGE (MULTI): ICD-10-CM

## 2024-10-28 DIAGNOSIS — G20.A1 MODERATE DEMENTIA DUE TO PARKINSON'S DISEASE, WITH ANXIETY (MULTI): ICD-10-CM

## 2024-10-28 DIAGNOSIS — I50.22 CHRONIC SYSTOLIC HEART FAILURE: ICD-10-CM

## 2024-10-28 DIAGNOSIS — I26.99 OTHER ACUTE PULMONARY EMBOLISM WITHOUT ACUTE COR PULMONALE: ICD-10-CM

## 2024-10-28 LAB
NON-UH HIE BASO COUNT: 0.04 X1000 (ref 0–0.2)
NON-UH HIE BASOS %: 0.8 %
NON-UH HIE BUN/CREAT RATIO: 27.8
NON-UH HIE BUN: 25 MG/DL (ref 9–23)
NON-UH HIE CALCIUM: 9.5 MG/DL (ref 8.7–10.4)
NON-UH HIE CALCULATED OSMOLALITY: 289 MOSM/KG (ref 275–295)
NON-UH HIE CHLORIDE: 107 MMOL/L (ref 98–107)
NON-UH HIE CO2, VENOUS: 30 MMOL/L (ref 20–31)
NON-UH HIE CREATININE: 0.9 MG/DL (ref 0.5–0.8)
NON-UH HIE DIFF?: NO
NON-UH HIE EOS COUNT: 0.05 X1000 (ref 0–0.5)
NON-UH HIE EOSIN %: 1 %
NON-UH HIE GFR AA: >60
NON-UH HIE GLOMERULAR FILTRATION RATE: 59 ML/MIN/1.73M?
NON-UH HIE GLUCOSE: 84 MG/DL (ref 74–106)
NON-UH HIE HCT: 33.1 % (ref 36–46)
NON-UH HIE HGB: 11 G/DL (ref 12–16)
NON-UH HIE INSTR WBC: 5.1
NON-UH HIE K: 4.6 MMOL/L (ref 3.5–5.1)
NON-UH HIE LYMPH %: 24.1 %
NON-UH HIE LYMPH COUNT: 1.23 X1000 (ref 1.2–4.8)
NON-UH HIE MCH: 28.7 PG (ref 27–34)
NON-UH HIE MCHC: 33.1 G/DL (ref 32–37)
NON-UH HIE MCV: 86.9 FL (ref 80–100)
NON-UH HIE MONO %: 10.9 %
NON-UH HIE MONO COUNT: 0.56 X1000 (ref 0.1–1)
NON-UH HIE MPV: 7.8 FL (ref 7.4–10.4)
NON-UH HIE NA: 143 MMOL/L (ref 135–145)
NON-UH HIE NEUTROPHIL %: 63.2 %
NON-UH HIE NEUTROPHIL COUNT (ANC): 3.24 X1000 (ref 1.4–8.8)
NON-UH HIE NUCLEATED RBC: 0 /100WBC
NON-UH HIE PLATELET: 242 X10 (ref 150–450)
NON-UH HIE RBC: 3.81 X10 (ref 4.2–5.4)
NON-UH HIE RDW: 16.6 % (ref 11.5–14.5)
NON-UH HIE RED BLOOD CELL MORPHOLOGY: ABNORMAL
NON-UH HIE WBC: 5.1 X10 (ref 4.5–11)

## 2024-10-28 PROCEDURE — 1036F TOBACCO NON-USER: CPT | Performed by: INTERNAL MEDICINE

## 2024-10-28 PROCEDURE — 1159F MED LIST DOCD IN RCRD: CPT | Performed by: INTERNAL MEDICINE

## 2024-10-28 PROCEDURE — 99213 OFFICE O/P EST LOW 20 MIN: CPT | Performed by: INTERNAL MEDICINE

## 2024-10-28 PROCEDURE — 1160F RVW MEDS BY RX/DR IN RCRD: CPT | Performed by: INTERNAL MEDICINE

## 2024-10-28 RX ORDER — CIPROFLOXACIN 250 MG/1
250 TABLET, FILM COATED ORAL 2 TIMES DAILY
Qty: 14 TABLET | Refills: 0 | Status: SHIPPED | OUTPATIENT
Start: 2024-10-28 | End: 2024-11-04

## 2024-10-29 ENCOUNTER — TELEPHONE (OUTPATIENT)
Dept: PRIMARY CARE | Facility: CLINIC | Age: 89
End: 2024-10-29
Payer: COMMERCIAL

## 2024-11-11 ENCOUNTER — APPOINTMENT (OUTPATIENT)
Dept: PRIMARY CARE | Facility: CLINIC | Age: 89
End: 2024-11-11
Payer: MEDICARE

## 2024-11-11 DIAGNOSIS — F41.9 ANXIETY: ICD-10-CM

## 2024-11-11 DIAGNOSIS — Z00.00 ENCOUNTER FOR MEDICARE ANNUAL WELLNESS EXAM: Primary | ICD-10-CM

## 2024-11-11 DIAGNOSIS — I10 HYPERTENSION, UNSPECIFIED TYPE: ICD-10-CM

## 2024-11-11 DIAGNOSIS — K59.00 CONSTIPATION, UNSPECIFIED CONSTIPATION TYPE: ICD-10-CM

## 2024-11-11 DIAGNOSIS — I48.91 ATRIAL FIBRILLATION, UNSPECIFIED TYPE (MULTI): ICD-10-CM

## 2024-11-11 PROCEDURE — 1170F FXNL STATUS ASSESSED: CPT | Performed by: EMERGENCY MEDICINE

## 2024-11-11 PROCEDURE — 99213 OFFICE O/P EST LOW 20 MIN: CPT | Performed by: EMERGENCY MEDICINE

## 2024-11-11 PROCEDURE — 1159F MED LIST DOCD IN RCRD: CPT | Performed by: EMERGENCY MEDICINE

## 2024-11-11 PROCEDURE — 1123F ACP DISCUSS/DSCN MKR DOCD: CPT | Performed by: EMERGENCY MEDICINE

## 2024-11-11 PROCEDURE — 1036F TOBACCO NON-USER: CPT | Performed by: EMERGENCY MEDICINE

## 2024-11-11 PROCEDURE — 1158F ADVNC CARE PLAN TLK DOCD: CPT | Performed by: EMERGENCY MEDICINE

## 2024-11-11 RX ORDER — BUSPIRONE HYDROCHLORIDE 5 MG/1
5 TABLET ORAL 3 TIMES DAILY
Qty: 90 TABLET | Refills: 2 | Status: SHIPPED | OUTPATIENT
Start: 2024-11-11 | End: 2025-02-09

## 2024-11-11 ASSESSMENT — PATIENT HEALTH QUESTIONNAIRE - PHQ9
SUM OF ALL RESPONSES TO PHQ9 QUESTIONS 1 AND 2: 0
2. FEELING DOWN, DEPRESSED OR HOPELESS: NOT AT ALL
1. LITTLE INTEREST OR PLEASURE IN DOING THINGS: NOT AT ALL

## 2024-11-11 ASSESSMENT — ACTIVITIES OF DAILY LIVING (ADL)
GROCERY_SHOPPING: INDEPENDENT
DRESSING: INDEPENDENT
MANAGING_FINANCES: INDEPENDENT
TAKING_MEDICATION: INDEPENDENT
DOING_HOUSEWORK: INDEPENDENT
BATHING: INDEPENDENT

## 2024-11-11 NOTE — PROGRESS NOTES
Subjective   Patient ID: Suly Bradley is a 89 y.o. female who presents for Medicare Annual Wellness Visit Subsequent (Discuss anxiety meds).    Assessment/Plan   Problem List Items Addressed This Visit    None  Patient presents for follow up visit and medicare wellness    Visit is completed by discussion with the caregiver at home    Anxiety - Patient caretaker complains of excessive anxiety    Constipation - Patient complains of some constipation, likely due to iron pills. Counseled her to take a stool softener or laxative OTC and monitor.    Edema- Currently controlled, use torsemide and Aldactone only as necessary    Hallucinations- Check CBC, CMP, TSH and UA/culture   CT brain ordered   Referred to [facility]     Atrial fibrillation- on Metoprolol and eliquis, follows regularly with Dr. Cohen.     Hypertension- stable, continue Lisinopril     GERD- Pantoprazole prn     Follow up as needed     PH Q-9 depression screening was completed by authorized employee of the practice for 5-10 minutes and  explained the questionnaire and discussed the answers with the patient.     Alcohol screening was completed for 5 to 10 minutes     I discussed advanced care planning for more than 16 minutes including the explanation and discussion of advanced directives. Information and advise was also provided on DO NOT RESUSCITATE and patient encouraged to consider this  Patient wishes to be DNR at this time.        Source of history: Nurse, Medical personnel, Medical record, Patient.  History limitation: None.    HPI  Patient presents for follow up visit and medicare wellness    This visit was completed virtually due to the restrictions of the COVID-19 pandemic. All issues as below were discussed and addressed but no physical exam was performed. If it was felt that the patient should be evaluated in clinic or ER, then they were directed there. The patient verbally consented to visit    Presents for long term health maintenance and  medication management     Patient is very hard of hearing.     History of multiple incidents of auditory and visual hallucinations, police were called twice    Lives in home independently, son lives 5 minutes away. Patient does not want to go to care facility.       Allergies   Allergen Reactions    Sulfa (Sulfonamide Antibiotics) Shortness of breath    Codeine Other    Meperidine Other    Morphine Other    Penicillins Rash       Current Outpatient Medications   Medication Sig Dispense Refill    alendronate (Fosamax) 70 mg tablet Take 1 tablet (70 mg) by mouth 1 (one) time per week.      cholecalciferol (Vitamin D-3) 5,000 Units tablet Take 1 tablet (5,000 Units) by mouth once daily. 90 tablet 0    Eliquis 2.5 mg tablet Take 1 tablet (2.5 mg) by mouth 2 times a day.      lisinopril 2.5 mg tablet Take by mouth once daily.      meclizine (Antivert) 12.5 mg tablet Take 1 tablet (12.5 mg) by mouth every 8 hours if needed.      metoprolol tartrate (Lopressor) 25 mg tablet Take 1 tablet (25 mg) by mouth 2 times a day.      pantoprazole (ProtoNix) 40 mg EC tablet Take 1 tablet (40 mg) by mouth 2 times a day. 90 tablet 1    Remeron 15 mg tablet 1 tablet (15 mg).      RisperDAL 1 mg tablet 1 tablet (1 mg) 2 times a day.      Stimulant Laxative Plus 8.6-50 mg tablet TAKE 1 TABLET BY MOUTH DAILY 30 tablet 0    simvastatin (Zocor) 40 mg tablet Take 1 tablet (40 mg) by mouth once daily at bedtime. (Patient not taking: Reported on 11/11/2024) 90 tablet 1    spironolactone (Aldactone) 50 mg tablet Take 1 tablet (50 mg) by mouth once daily for 10 days. 10 tablet 0    torsemide (Demadex) 20 mg tablet Take 1 tablet (20 mg) by mouth once daily for 10 days. 10 tablet 0     No current facility-administered medications for this visit.         Physical Exam  Vital signs as per nursing/MA documentation   General appearance: Alert and in no acute distress  HEENT: Normal Inspection   Neck: Normal Inspection   Respiratory: No respiratory  distress Lungs are clear   Cardiovascular: Heart rate normal. No gallop  Back: Normal Inspection   Skin inspection: Warm   Musculoskeletal: No deformities   Neuro: Limited exam. Baseline    Review of Systems   Comprehensive review of systems as allowed by patient condition and nursing input is negative    Orders Only on 10/28/2024   Component Date Value Ref Range Status    [facility] MCH 10/28/2024 28.7  27.0 - 34.0 pg Final    [facility] Instr WBC 10/28/2024 5.1   Final    [facility] MCV 10/28/2024 86.9  80.0 - 100.0 fL Final    [facility] Nucleated RBC 10/28/2024 0  /100WBC Final    [facility] Platelet 10/28/2024 242  150 - 450 x10 Final    [facility] HGB 10/28/2024 11.0 (L)  12.0 - 16.0 g/dL Final    [facility] MCHC 10/28/2024 33.1  32.0 - 37.0 g/dL Final    [facility] WBC 10/28/2024 5.1  4.5 - 11.0 x10 Final    [facility] MPV 10/28/2024 7.8  7.4 - 10.4 fL Final    [facility] HCT 10/28/2024 33.1 (L)  36.0 - 46.0 % Final    [facility] RDW 10/28/2024 16.6 (H)  11.5 - 14.5 % Final    [facility] RBC 10/28/2024 3.81 (L)  4.20 - 5.40 x10 Final    [facility] DIFF? 10/28/2024 No   Final    [facility] Eos Count 10/28/2024 0.05  0.00 - 0.50 x1000 Final    [facility] Lymph % 10/28/2024 24.1  % Final    [facility] Lymph Count 10/28/2024 1.23  1.20 - 4.80 x1000 Final    [facility] Basos % 10/28/2024 0.8  % Final    [facility] Baso Count 10/28/2024 0.04  0.00 - 0.20 x1000 Final    [facility] Mono % 10/28/2024 10.9  % Final    [facility] Neutrophil % 10/28/2024 63.2  % Final    [facility] Mono Count 10/28/2024 0.56  0.10 - 1.00 x1000 Final    [facility] Neutrophil Count (ANC) 10/28/2024 3.24  1.40 - 8.80 x1000 Final    [facility] Red Blood Cell Morpholo* 10/28/2024 See Notes (A)   Final    [facility] Eosin % 10/28/2024 1.0  % Final    [facility] Calculated Osmolality 10/28/2024 289  275 - 295 mOsm/kg Final    [facility] K 10/28/2024 4.6  3.5 - 5.1 mmol/L Final    [facility] Calcium 10/28/2024 9.5  8.7 - 10.4 mg/dL Final     [facility] Glucose 10/28/2024 84  74 - 106 mg/dL Final    [facility] BUN 10/28/2024 25 (H)  9 - 23 mg/dL Final    [facility] Glomerular Filtration R* 10/28/2024 59  mL/min/1.73m? Final    [facility] Chloride 10/28/2024 107  98 - 107 mmol/L Final    [facility] BUN/Creat Ratio 10/28/2024 27.8   Final    [facility] Creatinine 10/28/2024 0.9 (H)  0.5 - 0.8 mg/dL Final    [facility] Na 10/28/2024 143  135 - 145 mmol/L Final    [facility] GFR AA 10/28/2024 >60   Final    [facility] CO2, venous 10/28/2024 30.0  20.0 - 31.0 mmol/L Final       Radiology: Reviewed imaging in powerchart.  No results found.    No family history on file.  Social History     Socioeconomic History    Marital status: Single   Tobacco Use    Smoking status: Never    Smokeless tobacco: Never   Substance and Sexual Activity    Alcohol use: Not Currently     Past Medical History:   Diagnosis Date    Abdominal distension (gaseous) 07/26/2016    Abdominal bloating    Allergy status to unspecified drugs, medicaments and biological substances 12/14/2017    History of allergic reaction    Cellulitis of left toe 12/11/2017    Cellulitis of toe of left foot    Corns and callosities 05/17/2016    Callus    Dry mouth, unspecified     Dry mouth    Dysphagia, unspecified     Difficulty in swallowing    Dysphonia     Hoarseness    Encounter for screening mammogram for malignant neoplasm of breast 10/15/2015    Visit for screening mammogram    Muscle weakness (generalized)     Muscle weakness    Other acquired deformities of unspecified foot 05/17/2016    Hindfoot pronation    Other allergy status, other than to drugs and biological substances     Sensitivity to sunlight    Other conditions influencing health status     History of pregnancy    Pain in unspecified ankle and joints of unspecified foot 05/26/2016    Ankle pain    Pain in unspecified foot 05/16/2016    Foot pain    Pain in unspecified joint     Joint pain    Personal history of other diseases of the  musculoskeletal system and connective tissue     History of bone disorder    Personal history of other diseases of the nervous system and sense organs     History of tinnitus    Personal history of other medical treatment 2017    History of screening mammography    Personal history of other medical treatment     H/O mammogram    Personal history of other specified conditions 2017    History of dizziness    Personal history of other specified conditions 2013    History of abnormal weight loss    Personal history of other specified conditions     History of wheezing    Personal history of pneumonia (recurrent)     History of pneumonia    Spontaneous ecchymoses     Easy bruising    Sprain of joints and ligaments of unspecified parts of neck, initial encounter 2018    Acute cervical sprain    Strain of muscle, fascia and tendon of the posterior muscle group at thigh level, unspecified thigh, initial encounter     Hamstring tear    Tinea unguium 2016    Onychomycosis of toenail    Unspecified asthma, uncomplicated ([facility]) 2013    Asthma    Unspecified injury of unspecified wrist, hand and finger(s), initial encounter 2016    Finger injury     Past Surgical History:   Procedure Laterality Date     SECTION, CLASSIC  2014     Section    GALLBLADDER SURGERY  2014    Gallbladder Surgery    OTHER SURGICAL HISTORY  2014    Cath Placement Of Stent 2    TOTAL ABDOMINAL HYSTERECTOMY  2014    Total Abdominal Hysterectomy

## 2024-12-29 DIAGNOSIS — K21.9 GASTROESOPHAGEAL REFLUX DISEASE WITHOUT ESOPHAGITIS: ICD-10-CM

## 2024-12-30 RX ORDER — PANTOPRAZOLE SODIUM 40 MG/1
40 TABLET, DELAYED RELEASE ORAL 2 TIMES DAILY
Qty: 90 TABLET | Refills: 1 | Status: SHIPPED | OUTPATIENT
Start: 2024-12-30

## 2025-01-16 ENCOUNTER — TELEPHONE (OUTPATIENT)
Dept: PRIMARY CARE | Facility: CLINIC | Age: OVER 89
End: 2025-01-16
Payer: COMMERCIAL

## 2025-01-22 ENCOUNTER — TELEPHONE (OUTPATIENT)
Dept: PRIMARY CARE | Facility: CLINIC | Age: OVER 89
End: 2025-01-22
Payer: COMMERCIAL

## 2025-01-27 ENCOUNTER — TELEPHONE (OUTPATIENT)
Dept: PRIMARY CARE | Facility: CLINIC | Age: OVER 89
End: 2025-01-27
Payer: COMMERCIAL

## 2025-01-27 NOTE — TELEPHONE ENCOUNTER
Message was left for patient stating she has been home from the hospital for 1 week. Was only gicen 3 days of prednisone and antibiotic at discharge. She is experiencing wheezing / cough and concerned she is becoming sick again. Requesting prescription, please advise.

## 2025-01-27 NOTE — TELEPHONE ENCOUNTER
PT's caretaker is calling again.    Message was left for patient stating she has been home from the hospital for 1 week. Was only gicen 3 days of prednisone and antibiotic at discharge. She is experiencing wheezing / cough and concerned she is becoming sick again. Requesting prescription, please advise.

## 2025-01-29 ENCOUNTER — OFFICE VISIT (OUTPATIENT)
Dept: PRIMARY CARE | Facility: CLINIC | Age: OVER 89
End: 2025-01-29
Payer: MEDICARE

## 2025-01-29 VITALS
OXYGEN SATURATION: 97 % | SYSTOLIC BLOOD PRESSURE: 149 MMHG | BODY MASS INDEX: 20.62 KG/M2 | HEIGHT: 60 IN | HEART RATE: 89 BPM | WEIGHT: 105 LBS | DIASTOLIC BLOOD PRESSURE: 64 MMHG

## 2025-01-29 DIAGNOSIS — F03.918 SENILE DEMENTIA WITH DEPRESSION WITH BEHAVIORAL DISTURBANCE (MULTI): ICD-10-CM

## 2025-01-29 DIAGNOSIS — F03.93 SENILE DEMENTIA WITH DEPRESSION WITH BEHAVIORAL DISTURBANCE (MULTI): ICD-10-CM

## 2025-01-29 DIAGNOSIS — G23.8 OTHER SPECIFIED DEGENERATIVE DISEASES OF BASAL GANGLIA: ICD-10-CM

## 2025-01-29 DIAGNOSIS — Z76.89 ENCOUNTER FOR SUPPORT AND COORDINATION OF TRANSITION OF CARE: Primary | ICD-10-CM

## 2025-01-29 DIAGNOSIS — M06.9 RHEUMATOID ARTHRITIS, INVOLVING UNSPECIFIED SITE, UNSPECIFIED WHETHER RHEUMATOID FACTOR PRESENT (MULTI): ICD-10-CM

## 2025-01-29 DIAGNOSIS — F03.B4 MODERATE DEMENTIA WITH ANXIETY, UNSPECIFIED DEMENTIA TYPE (MULTI): ICD-10-CM

## 2025-01-29 DIAGNOSIS — L97.529 NON-PRESSURE CHRONIC ULCER OF OTHER PART OF LEFT FOOT WITH UNSPECIFIED SEVERITY (MULTI): ICD-10-CM

## 2025-01-29 DIAGNOSIS — J40 BRONCHITIS: ICD-10-CM

## 2025-01-29 DIAGNOSIS — J44.89 OTHER SPECIFIED CHRONIC OBSTRUCTIVE PULMONARY DISEASE: ICD-10-CM

## 2025-01-29 DIAGNOSIS — R60.9 EDEMA, UNSPECIFIED TYPE: ICD-10-CM

## 2025-01-29 DIAGNOSIS — I48.91 ATRIAL FIBRILLATION, UNSPECIFIED TYPE (MULTI): ICD-10-CM

## 2025-01-29 DIAGNOSIS — K86.1 OTHER CHRONIC PANCREATITIS: ICD-10-CM

## 2025-01-29 DIAGNOSIS — H34.8122 CENTRAL RETINAL VEIN OCCLUSION, LEFT EYE, STABLE (CMS-HCC): ICD-10-CM

## 2025-01-29 DIAGNOSIS — F25.0: ICD-10-CM

## 2025-01-29 DIAGNOSIS — I50.9 HEART FAILURE, UNSPECIFIED HF CHRONICITY, UNSPECIFIED HEART FAILURE TYPE: ICD-10-CM

## 2025-01-29 PROCEDURE — 99495 TRANSJ CARE MGMT MOD F2F 14D: CPT | Performed by: EMERGENCY MEDICINE

## 2025-01-29 PROCEDURE — 1036F TOBACCO NON-USER: CPT | Performed by: EMERGENCY MEDICINE

## 2025-01-29 PROCEDURE — 1159F MED LIST DOCD IN RCRD: CPT | Performed by: EMERGENCY MEDICINE

## 2025-01-29 RX ORDER — LEVOFLOXACIN 500 MG/1
500 TABLET, FILM COATED ORAL DAILY
Qty: 7 TABLET | Refills: 0 | Status: SHIPPED | OUTPATIENT
Start: 2025-01-29 | End: 2025-02-05

## 2025-01-29 RX ORDER — TRAZODONE HYDROCHLORIDE 100 MG/1
100 TABLET ORAL NIGHTLY PRN
Qty: 30 TABLET | Refills: 0 | Status: SHIPPED | OUTPATIENT
Start: 2025-01-29 | End: 2026-01-29

## 2025-01-29 RX ORDER — SPIRONOLACTONE 50 MG/1
50 TABLET, FILM COATED ORAL DAILY
Qty: 10 TABLET | Refills: 0 | Status: SHIPPED | OUTPATIENT
Start: 2025-01-29 | End: 2025-02-08

## 2025-01-29 RX ORDER — QUETIAPINE FUMARATE 25 MG/1
25 TABLET, FILM COATED ORAL 2 TIMES DAILY PRN
Qty: 60 TABLET | Refills: 2 | Status: SHIPPED | OUTPATIENT
Start: 2025-01-29 | End: 2025-04-29

## 2025-01-29 RX ORDER — LATANOPROST 50 UG/ML
SOLUTION/ DROPS OPHTHALMIC
COMMUNITY
Start: 2025-01-03

## 2025-01-29 RX ORDER — TORSEMIDE 20 MG/1
20 TABLET ORAL DAILY
Qty: 10 TABLET | Refills: 0 | Status: SHIPPED | OUTPATIENT
Start: 2025-01-29 | End: 2025-02-08

## 2025-01-29 RX ORDER — ALBUTEROL SULFATE 0.83 MG/ML
2.5 SOLUTION RESPIRATORY (INHALATION) 4 TIMES DAILY PRN
Qty: 3 ML | Refills: 2 | Status: SHIPPED | OUTPATIENT
Start: 2025-01-29 | End: 2025-04-29

## 2025-01-29 ASSESSMENT — PATIENT HEALTH QUESTIONNAIRE - PHQ9
SUM OF ALL RESPONSES TO PHQ9 QUESTIONS 1 AND 2: 0
1. LITTLE INTEREST OR PLEASURE IN DOING THINGS: NOT AT ALL
2. FEELING DOWN, DEPRESSED OR HOPELESS: NOT AT ALL

## 2025-01-29 ASSESSMENT — ENCOUNTER SYMPTOMS
WHEEZING: 1
COUGH: 1

## 2025-01-29 NOTE — PROGRESS NOTES
Subjective   Patient ID: Suly Bradley is a 89 y.o. female who presents for transition of care   Assessment/Plan   Problem List Items Addressed This Visit    None    Transitional care management    Bronchitis-treat with Levaquin for 7 days and give albuterol nebulizer for as needed    Agitation and hallucinations-this is secondary to senile dementia.  Risperdal did not help.  Will give her trazodone at bedtime and Seroquel during the day as needed.  Also follows up with psychiatrist Dr. Doss    Anxiety - Patient caretaker complains of excessive anxiety    Constipation - Patient complains of some constipation, likely due to iron pills. Counseled her to take a stool softener or laxative OTC and monitor.    Edema- Currently controlled, use torsemide and Aldactone only as necessary    Hallucinations- Check CBC, CMP, TSH and UA/culture   CT brain ordered   Referred to [facility]     Atrial fibrillation- on Metoprolol and eliquis, follows regularly with Dr. Cohen.     Hypertension- stable, continue Lisinopril     GERD- Pantoprazole prn     Follow up as needed         Source of history: Nurse, Medical personnel, Medical record, Patient.  History limitation: None.    Wheezing     Cough      Patient was discharged from [facility] on _January 29 and there was interactive contact by patient/family or facility staff on behalf of patient with me/office within 2 working days regarding patient's transition    Patient was recently admitted to the hospital.  Patient's history regarding the reason for hospital admission and the course in the hospital was reviewed with patinet and/or family.  Patient's medical records including lab work, radiology and other medical notes were reviewed.  His medication list prior to admission and discharge medication list are compared and updated.    Patient encounter was done face-to-face    Patient is unable to give detailed history and therefore history is obtained from the  chart    History from hospitalization-    Patient is very hard of hearing.   Patient was admitted with cough congestion and wheezing and was diagnosed as bacterial bronchitis and treated with antibiotics and steroids improved and discharged    History of multiple incidents of auditory and visual hallucinations, police were called twice    Lives in home independently, son lives 5 minutes away. Patient does not want to go to care facility.       Allergies   Allergen Reactions    Sulfa (Sulfonamide Antibiotics) Shortness of breath    Codeine Other    Meperidine Other    Morphine Other    Penicillins Rash       Current Outpatient Medications   Medication Sig Dispense Refill    busPIRone (Buspar) 5 mg tablet Take 1 tablet (5 mg) by mouth 3 times a day. 90 tablet 2    cholecalciferol (Vitamin D-3) 5,000 Units tablet Take 1 tablet (5,000 Units) by mouth once daily. 90 tablet 0    Eliquis 2.5 mg tablet Take 1 tablet (2.5 mg) by mouth 2 times a day.      latanoprost (Xalatan) 0.005 % ophthalmic solution apply 1 drop IN THE LEFT EYE DAILY      lisinopril 2.5 mg tablet Take by mouth once daily.      meclizine (Antivert) 12.5 mg tablet Take 1 tablet (12.5 mg) by mouth every 8 hours if needed.      metoprolol tartrate (Lopressor) 25 mg tablet Take 1 tablet (25 mg) by mouth 2 times a day.      pantoprazole (ProtoNix) 40 mg EC tablet Take 1 tablet by mouth 2 times a day. 90 tablet 1    Remeron 15 mg tablet 1 tablet (15 mg).      RisperDAL 1 mg tablet 1 tablet (1 mg) 2 times a day.      Stimulant Laxative Plus 8.6-50 mg tablet TAKE 1 TABLET BY MOUTH DAILY 30 tablet 0    alendronate (Fosamax) 70 mg tablet Take 1 tablet (70 mg) by mouth 1 (one) time per week. (Patient not taking: Reported on 1/29/2025)      simvastatin (Zocor) 40 mg tablet Take 1 tablet (40 mg) by mouth once daily at bedtime. (Patient not taking: Reported on 6/14/2024) 90 tablet 1    spironolactone (Aldactone) 50 mg tablet Take 1 tablet (50 mg) by mouth once daily  for 10 days. 10 tablet 0    torsemide (Demadex) 20 mg tablet Take 1 tablet (20 mg) by mouth once daily for 10 days. 10 tablet 0     No current facility-administered medications for this visit.         Physical Exam  Vital signs as per nursing/MA documentation   General appearance: Alert and in no acute distress  HEENT: Normal Inspection   Neck: Normal Inspection   Respiratory: No respiratory distress Lungs are clear   Cardiovascular: Heart rate normal. No gallop  Back: Normal Inspection   Skin inspection: Warm   Musculoskeletal: No deformities   Neuro: Limited exam. Baseline    Review of Systems   Comprehensive review of systems as allowed by patient condition and nursing input is negative    Orders Only on 10/28/2024   Component Date Value Ref Range Status    [facility] MCH 10/28/2024 28.7  27.0 - 34.0 pg Final    [facility] Instr WBC 10/28/2024 5.1   Final    [facility] MCV 10/28/2024 86.9  80.0 - 100.0 fL Final    [facility] Nucleated RBC 10/28/2024 0  /100WBC Final    [facility] Platelet 10/28/2024 242  150 - 450 x10 Final    [facility] HGB 10/28/2024 11.0 (L)  12.0 - 16.0 g/dL Final    [facility] MCHC 10/28/2024 33.1  32.0 - 37.0 g/dL Final    [facility] WBC 10/28/2024 5.1  4.5 - 11.0 x10 Final    [facility] MPV 10/28/2024 7.8  7.4 - 10.4 fL Final    [facility] HCT 10/28/2024 33.1 (L)  36.0 - 46.0 % Final    [facility] RDW 10/28/2024 16.6 (H)  11.5 - 14.5 % Final    [facility] RBC 10/28/2024 3.81 (L)  4.20 - 5.40 x10 Final    [facility] DIFF? 10/28/2024 No   Final    [facility] Eos Count 10/28/2024 0.05  0.00 - 0.50 x1000 Final    [facility] Lymph % 10/28/2024 24.1  % Final    [facility] Lymph Count 10/28/2024 1.23  1.20 - 4.80 x1000 Final    [facility] Basos % 10/28/2024 0.8  % Final    [facility] Baso Count 10/28/2024 0.04  0.00 - 0.20 x1000 Final    [facility] Mono % 10/28/2024 10.9  % Final    [facility] Neutrophil % 10/28/2024 63.2  % Final    [facility] Mono Count 10/28/2024 0.56  0.10 - 1.00 x1000 Final     [facility] Neutrophil Count (ANC) 10/28/2024 3.24  1.40 - 8.80 x1000 Final    [facility] Red Blood Cell Morpholo* 10/28/2024 See Notes (A)   Final    [facility] Eosin % 10/28/2024 1.0  % Final    [facility] Calculated Osmolality 10/28/2024 289  275 - 295 mOsm/kg Final    [facility] K 10/28/2024 4.6  3.5 - 5.1 mmol/L Final    [facility] Calcium 10/28/2024 9.5  8.7 - 10.4 mg/dL Final    [facility] Glucose 10/28/2024 84  74 - 106 mg/dL Final    [facility] BUN 10/28/2024 25 (H)  9 - 23 mg/dL Final    [facility] Glomerular Filtration R* 10/28/2024 59  mL/min/1.73m? Final    [facility] Chloride 10/28/2024 107  98 - 107 mmol/L Final    [facility] BUN/Creat Ratio 10/28/2024 27.8   Final    [facility] Creatinine 10/28/2024 0.9 (H)  0.5 - 0.8 mg/dL Final    [facility] Na 10/28/2024 143  135 - 145 mmol/L Final    [facility] GFR AA 10/28/2024 >60   Final    [facility] CO2, venous 10/28/2024 30.0  20.0 - 31.0 mmol/L Final       Radiology: Reviewed imaging in powerchart.  No results found.    No family history on file.  Social History     Socioeconomic History    Marital status: Single   Tobacco Use    Smoking status: Never    Smokeless tobacco: Never   Substance and Sexual Activity    Alcohol use: Not Currently     Past Medical History:   Diagnosis Date    Abdominal distension (gaseous) 07/26/2016    Abdominal bloating    Allergy status to unspecified drugs, medicaments and biological substances 12/14/2017    History of allergic reaction    Cellulitis of left toe 12/11/2017    Cellulitis of toe of left foot    Corns and callosities 05/17/2016    Callus    Dry mouth, unspecified     Dry mouth    Dysphagia, unspecified     Difficulty in swallowing    Dysphonia     Hoarseness    Encounter for screening mammogram for malignant neoplasm of breast 10/15/2015    Visit for screening mammogram    Muscle weakness (generalized)     Muscle weakness    Other acquired deformities of unspecified foot 05/17/2016    Hindfoot pronation     Other allergy status, other than to drugs and biological substances     Sensitivity to sunlight    Other conditions influencing health status     History of pregnancy    Pain in unspecified ankle and joints of unspecified foot 2016    Ankle pain    Pain in unspecified foot 2016    Foot pain    Pain in unspecified joint     Joint pain    Personal history of other diseases of the musculoskeletal system and connective tissue     History of bone disorder    Personal history of other diseases of the nervous system and sense organs     History of tinnitus    Personal history of other medical treatment 2017    History of screening mammography    Personal history of other medical treatment     H/O mammogram    Personal history of other specified conditions 2017    History of dizziness    Personal history of other specified conditions 2013    History of abnormal weight loss    Personal history of other specified conditions     History of wheezing    Personal history of pneumonia (recurrent)     History of pneumonia    Spontaneous ecchymoses     Easy bruising    Sprain of joints and ligaments of unspecified parts of neck, initial encounter 2018    Acute cervical sprain    Strain of muscle, fascia and tendon of the posterior muscle group at thigh level, unspecified thigh, initial encounter     Hamstring tear    Tinea unguium 2016    Onychomycosis of toenail    Unspecified asthma, uncomplicated ([facility]) 2013    Asthma    Unspecified injury of unspecified wrist, hand and finger(s), initial encounter 2016    Finger injury     Past Surgical History:   Procedure Laterality Date     SECTION, CLASSIC  2014     Section    GALLBLADDER SURGERY  2014    Gallbladder Surgery    OTHER SURGICAL HISTORY  2014    Cath Placement Of Stent 2    TOTAL ABDOMINAL HYSTERECTOMY  2014    Total Abdominal Hysterectomy